# Patient Record
Sex: FEMALE | Race: WHITE
[De-identification: names, ages, dates, MRNs, and addresses within clinical notes are randomized per-mention and may not be internally consistent; named-entity substitution may affect disease eponyms.]

---

## 2017-03-06 NOTE — ER DOCUMENT REPORT
HPI





- HPI


Patient complains to provider of: right eye redness


Onset: Yesterday


Onset/Duration: Gradual


Quality of pain: Burning


Pain Level: 4


Context: 


Patient complains of right eye redness and drainage that started yesterday.  

Patient states she woke up today with some matting to her eye.  Patient denies 

any change in vision.  Patient does state that she wears 2 week disposable 

contact lenses but has not worn them in over a week.  Patient does report some 

light sensitivity.  Patient denies sleeping in her contact lenses.


Associated Symptoms: Other - Right eye redness


Exacerbated by: Denies


Relieved by: Denies


Similar symptoms previously: No


Recently seen / treated by doctor: No





- ROS


ROS below otherwise negative: Yes


Systems Reviewed and Negative: Yes All other systems reviewed and negative





- EENT


EENT: REPORTS: Eye problems





- NEURO


Neurology: DENIES: Headache





- GASTROINTESTINAL


Gastrointestinal: DENIES: Patient vomiting





- REPRODUCTIVE


Reproductive: DENIES: Pregnant:





- DERM


Skin Color: Normal


Skin Problems: None





Past Medical History





- General


Information source: Patient





- Social History


Smoking Status: Never Smoker


Frequency of alcohol use: None


Drug Abuse: None


Occupation: none


Lives with: Family


Family History: Reviewed & Not Pertinent





- Medical History


Medical History: Negative


Pulmonary Medical History: Reports: Hx Asthma


   Denies: Hx Tuberculosis


Surgical Hx: Negative





- Immunizations


Hx Diphtheria, Pertussis, Tetanus Vaccination: No





Vertical Provider Document





- CONSTITUTIONAL


Agree With Documented VS: Yes


Exam Limitations: No Limitations


General Appearance: WD/WN





- HEENT


HEENT: Atraumatic, Normocephalic


Notes: 


right eye injected, no purulent drainage. Pt with 1 mm opacificated area 

concerning for corneal ulcer. No corneal abrasion, dendrite, or foreign body. 

EOMI, PERRL





- NECK


Neck: Normal Inspection





- RESPIRATORY


Respiratory: No Respiratory Distress


O2 Sat by Pulse Oximetry: 97





- MUSCULOSKELETAL/EXTREMETIES


Musculoskeletal/Extremeties: MAEW





- NEURO


Level of Consciousness: Awake, Alert, Appropriate


Motor/Sensory: No Motor Deficit





- DERM


Integumentary: Warm, Dry, No Rash





Course





- Re-evaluation


Re-evalutation: 





03/06/17 14:53


consulted with dr Daily who agrees to see pt in office tomorrow, recommends 

besivance 1 gtt q2 hour while awake and erthromycin ointment at bedtime.





- Vital Signs


Vital signs: 


 











Temp Pulse Resp BP Pulse Ox


 


 97.7 F   73   24 H  153/88 H  97 


 


 03/06/17 12:27  03/06/17 12:27  03/06/17 12:27  03/06/17 12:27  03/06/17 12:27














Discharge





- Discharge


Clinical Impression: 


Cornea ulcer


Qualifiers:


 Laterality: right Qualified Code(s): H16.001 - Unspecified corneal ulcer, 

right eye





Condition: Stable


Disposition: HOME, SELF-CARE


Instructions:  Eyedrop Use ([facility]), Antibiotic Therapy ([facility]), Corneal Ulceration (

[facility])


Additional Instructions: 


Return immediately for any new or worsening symptoms





Followup with your primary care provider, call tomorrow to make a followup 

appointment





Follow up with Dr. Daily tomorrow morning at 8:30, bring your glasses with you 

as well as her current insurance card.





Call your primary care provider today to let them know that you need emergent 

ophthalmology follow-up, and will need a referral to Dr. Daily's office





Do not wear ear contact lenses until cleared to do so by an ophthalmologist.


Prescriptions: 


Erythromycin Base [Erythromycin] 1 applic OP QHS #3.5 oint..gm.


Besifloxacin HCl [Besivance 0.6% Oph Susp 5 ml] 1 drop OP ASDIR PRN #1 bottle


 PRN Reason: 


Forms:  Elevated Blood Pressure


Referrals: 


CUBA DAILY MD [ACTIVE STAFF] - Follow up tomorrow

## 2017-08-23 NOTE — ER DOCUMENT REPORT
ED GI/





- General


Mode of Arrival: Ambulatory


Information source: Patient


TRAVEL OUTSIDE OF THE [place] IN LAST 30 DAYS: No





- HPI


Patient complains to provider of: Dysuria.  No: Vaginal discharge


Associated symptoms: Other - see above





- General


Chief Complaint: Pain With Urination


Stated Complaint: URINARY SYMPTOMS


Time Seen by Provider: 08/23/17 08:31


Notes: 


Patient is a 32 year old female who presents to the ED with complaints of 

dysuria, burning and frequency with urination.  Patient denies any vaginal 

discharge.  Patient states that she has had bladder infections in the past. 

Patients last menstrual period was 08/07/2017.  


 (RODOLFO LOPEZ)





- Related Data


Allergies/Adverse Reactions: 


 





No Known Allergies Allergy (Verified 08/23/17 08:20)


 











Past Medical History





- General


Information source: Patient





- Social History


Smoking Status: Never Smoker


Chew tobacco use (# tins/day): No


Smoking Education Provided: No


Frequency of alcohol use: None


Family History: Reviewed & Not Pertinent


Patient has suicidal ideation: No


Patient has homicidal ideation: No


Pulmonary Medical History: Reports: Hx Asthma


Renal/ Medical History: Denies: Hx Peritoneal Dialysis


Surgical Hx: Negative





- Immunizations


Hx Diphtheria, Pertussis, Tetanus Vaccination: No





Review of Systems





- Review of Systems


Constitutional: No symptoms reported


EENT: No symptoms reported


Cardiovascular: No symptoms reported


Respiratory: No symptoms reported


Gastrointestinal: No symptoms reported


Genitourinary: See HPI, Burning, Dysuria, Frequency


Female Genitourinary: No symptoms reported


Musculoskeletal: No symptoms reported


Skin: No symptoms reported


Hematologic/Lymphatic: No symptoms reported


Neurological/Psychological: No symptoms reported





Physical Exam





- General


General appearance: Appears well, Alert


In distress: None





- HEENT


Head: Normocephalic, Atraumatic


Eyes: Normal


Extraocular movements intact: Yes


Pupils: PERRL





- Respiratory


Respiratory status: No respiratory distress





- Abdominal


Inspection: Obese


Tenderness: Tender - superpubic





- Back


Back: Normal





- Extremities


General upper extremity: Normal inspection, Normal ROM


General lower extremity: Normal inspection, Normal ROM





- Neurological


Neuro grossly intact: Yes





- Psychological


Associated symptoms: Normal affect, Normal mood





- Skin


Skin Temperature: Warm


Skin Moisture: Dry


Skin Color: Normal





- Vital signs


Vitals: 





 











Temp Pulse Resp BP Pulse Ox


 


 98.2 F   86   16   154/104 H  96 


 


 08/23/17 08:23  08/23/17 08:23  08/23/17 08:23  08/23/17 08:23  08/23/17 08:23














- Vital Signs


Vital signs: 





 











Temp Pulse Resp BP Pulse Ox


 


 98.2 F   86   16   154/104 H  96 


 


 08/23/17 08:23  08/23/17 08:23  08/23/17 08:23  08/23/17 08:23  08/23/17 08:23














- Laboratory


Laboratory results interpreted by me: 





 











  08/23/17





  09:27


 


Urine Protein  >=500 H


 


Urine Blood  MODERATE H


 


Ur Leukocyte Esterase  LARGE H














Discharge





- Discharge


Clinical Impression: 


 Hemorrhagic cystitis





Condition: Stable


Disposition: HOME, SELF-CARE


Additional Instructions: 


Urinary Tract Infection





     Your evaluation indicates that you have a urinary tract infection. This is 

due to germs growing in the bladder.  This is a common problem.


     This infection usually responds quickly to antibiotics.  Your antibiotic 

should be taken exactly as prescribed.  Drink plenty of fluids -- three to four 

quarts a day.


     Occasionally, a bladder anesthetic will be prescribed to help stop the 

feeling of urgency until the antibiotic has a chance to clear the infection.  

This may cause your urine to be dark orange.


     Certain urine infections require a culture.  If the doctor obtained a 

culture, the results will be back in two days.  You should call to see if a 

change in treatment is needed.


     A repeat urinalysis after you finish treatment is often recommended.  The 

physician will let you know if further testing is required.


     Call the doctor if you develop fever, chills, flank pain, inability to 

urinate, or blood in the urine.








TAKE THE MEDICATIONS AS PRESCRIBED.


DRINK PLENTY OF FLUIDS.


FOLLOW UP WITH A LOCAL MEDICAL DOCTOR IF NOT IMPROVING.





RETURN TO THE EMERGENCY ROOM IF ANY NEW OR WORSENING SYMPTOMS.








Prescriptions: 


Cephalexin Monohydrate [Keflex 500 mg Capsule] 500 mg PO QID #20 capsule


Phenazopyridine HCl [Pyridium 200 mg Tablet] 200 mg PO TID #10 tablet


Referrals: 


MEGHA CORRALES MD [Primary Care Provider] - Follow up as needed


Scribe Attestation: 





08/23/17 10:07


I personally performed the services described in the documentation, reviewed 

and edited the documentation which was dictated to the scribe in my presence, 

and it accurately records my words and actions. (NEENA ROMERO)





Scribe Documentation





- Scribe


Written by Kit:: kit Morrison, 08/23/2017, 0843


acting as scribe for :: Love

## 2017-12-17 NOTE — ER DOCUMENT REPORT
ED General





- General


Chief Complaint: Arm Pain


Stated Complaint: ELBOW/JAW PAIN, HAND SWELLING


Time Seen by Provider: 12/17/17 11:41


TRAVEL OUTSIDE OF THE [place] IN LAST 30 DAYS: No





- HPI


Patient complains to provider of: Right elbow pain left hand pain left jaw pain


Notes: 





Patient coming in for the above-stated symptoms ongoing for the past month.  

Patient denies any trauma.  Patient denies any past medical history denies 

taking medication patient she has been taking Tylenol for her pain control 

patient states the reason for coming in today because she wanted an opinion 

about what could possibly be going on.  Denies any fevers chills nausea vomiting





- Related Data


Allergies/Adverse Reactions: 


 





No Known Allergies Allergy (Verified 12/17/17 10:58)


 











Past Medical History





- Social History


Smoking Status: Never Smoker


Frequency of alcohol use: None


Drug Abuse: None


Family History: Reviewed & Not Pertinent


Patient has suicidal ideation: No


Patient has homicidal ideation: No


Pulmonary Medical History: Reports: Hx Asthma


Renal/ Medical History: Denies: Hx Peritoneal Dialysis





- Immunizations


Hx Diphtheria, Pertussis, Tetanus Vaccination: No





Review of Systems





- Review of Systems


Constitutional: No symptoms reported


EENT: No symptoms reported


Cardiovascular: No symptoms reported


Respiratory: No symptoms reported


Gastrointestinal: No symptoms reported


Genitourinary: No symptoms reported


Female Genitourinary: No symptoms reported


Musculoskeletal: Other - Right elbow pain left hand pain left jaw pain


Skin: No symptoms reported


Hematologic/Lymphatic: No symptoms reported


Neurological/Psychological: No symptoms reported





Physical Exam





- Vital signs


Vitals: 





 











Temp Pulse Resp BP Pulse Ox


 


 99.1 F   73   16   146/86 H  98 


 


 12/17/17 11:10  12/17/17 11:10  12/17/17 11:10  12/17/17 11:10  12/17/17 11:10











Interpretation: Normal





- General


General appearance: Appears well, Alert





- HEENT


Head: Normocephalic, Atraumatic


Eyes: Normal


Conjunctiva: Normal


Cornea: Normal


Extraocular movements intact: Yes


Eyelashes: Normal


Pupils: PERRL


Ears: Normal


External canal: Normal


Tympanic membrane: Normal


Sinus: Normal


Nasal: Normal


Mouth/Lips: Normal


Mucous membranes: Normal


Pharynx: Normal


Neck: Normal





- Respiratory


Respiratory status: No respiratory distress


Chest status: Nontender


Breath sounds: Normal


Chest palpation: Normal





- Cardiovascular


Rhythm: Regular


Heart sounds: Normal auscultation


Murmur: No





- Abdominal


Inspection: Normal


Distension: No distension


Bowel sounds: Normal


Tenderness: Nontender


Organomegaly: No organomegaly





- Back


Back: Normal, Nontender





- Extremities


General upper extremity: Nontender, Normal color, Normal ROM, Normal 

temperature.  No: Normal inspection - Small swelling of the left MCP joint of 

the index finger no decreased range of motion no redness no signs of infected 

etiology.


General lower extremity: Normal inspection, Nontender, Normal color, Normal ROM

, Normal temperature, Normal weight bearing.  No: Eric's sign





- Neurological


Neuro grossly intact: Yes


Cognition: Normal


Orientation: AAOx4


[place] Coma Scale Eye Opening: Spontaneous


Andrei Coma Scale Verbal: Oriented


Andrei Coma Scale Motor: Obeys Commands


Andrei Coma Scale Total: 15


Speech: Normal


Motor strength normal: LUE, RUE, LLE, RLE


Sensory: Normal





- Psychological


Associated symptoms: Normal affect, Normal mood





- Skin


Skin Temperature: Warm


Skin Moisture: Dry


Skin Color: Normal





Course





- Re-evaluation


Re-evalutation: 





12/17/17 15:43


Patient coming in for evaluation of right elbow pain left hand pain left jaw 

pain.  Examination shows some slight swelling of the joints no swelling of the 

jaw patient has full range of motion of her jaw.  No signs of dental infection 

or signs of ear infection.  Unclear etiology jaw pain possibility of underlying 

connective tissue issue or rheumatoid arthritis for the elbow and for the hand.

  Explained to patient she will need follow-up with a family care physician for 

further etiologies





- Vital Signs


Vital signs: 





 











Temp Pulse Resp BP Pulse Ox


 


 99.1 F   73   16   146/86 H  98 


 


 12/17/17 11:10  12/17/17 11:10  12/17/17 11:10  12/17/17 11:10  12/17/17 11:10














Discharge





- Discharge


Clinical Impression: 


 joint pain and swelling





Disposition: HOME, SELF-CARE


Instructions:  Arthralgia ([facility])


Additional Instructions: 


Examination today is consistent with more likely underlying soft tissue disease 

like rheumatoid arthritis.  Specific testing will need to be performed by 

outpatient provider.  Your examination that arrives reveals no critical 

etiology.  We will start you on steroids and anti-inflammatory medication you 

can continue to take you continue Tylenol for pain control.  I will give her 

information to our social workers to assist you in follow-up.


Prescriptions: 


Naproxen [Naprosyn 250 mg Tablet] 250 mg PO DAILY PRN #30 tablet


 PRN Reason: 


Prednisone [Deltasone] 60 mg PO DAILY #24 tablet


Forms:  Return to Work


Referrals: 


[facility] [Provider Group] - Follow up as needed

## 2018-01-09 NOTE — ER DOCUMENT REPORT
HPI





- HPI


Patient complains to provider of: Right elbow pain and left second MCP joint 

pain


Onset: Other - Early January


Onset/Duration: Gradual


Quality of pain: Throbbing


Pain Level: 3


Context: 





32-year-old morbidly obese female is complaining of reoccurrence of her right 

elbow pain causing her to not be able to extend fully and also pain in her 

second left MCP joint.  She was seen in the emergency department in December 

and told that she might have arthritis and needed to follow-up.  She is a 

patient of [facility] and is waiting for them to call her 

back for referral.  She is here today because it resolved after her December 

treatment with anti-inflammatories and prednisone and is now reoccurred.  No 

history of gout.  No fever.


Associated Symptoms: None


Exacerbated by: Denies


Relieved by: Denies


Similar symptoms previously: Yes


Recently seen / treated by doctor: No





- ROS


ROS below otherwise negative: Yes


Systems Reviewed and Negative: Yes All other systems reviewed and negative





- REPRODUCTIVE


Reproductive: DENIES: Pregnant:





- MUSCULOSKELETAL


Musculoskeletal: REPORTS: Extremity pain





Past Medical History





- General


Information source: Patient





- Social History


Smoking Status: Never Smoker


Chew tobacco use (# tins/day): No


Frequency of alcohol use: None


Drug Abuse: None


Lives with: Family


Family History: Reviewed & Not Pertinent


Patient has suicidal ideation: No


Patient has homicidal ideation: No


Pulmonary Medical History: Reports: Hx Asthma


Renal/ Medical History: Denies: Hx Peritoneal Dialysis


Surgical Hx: Negative





- Immunizations


Hx Diphtheria, Pertussis, Tetanus Vaccination: No





Vertical Provider Document





- CONSTITUTIONAL


Agree With Documented VS: Yes


Exam Limitations: No Limitations





- INFECTION CONTROL


TRAVEL OUTSIDE OF THE [place] IN LAST 30 DAYS: No





- HEENT


HEENT: Atraumatic, Normocephalic





- NECK


Neck: Supple


Notes: 





tender top of left shoulder, above the mid clavicle





- RESPIRATORY


Respiratory: Breath Sounds Normal, No Respiratory Distress


O2 Sat by Pulse Oximetry: 100





- CARDIOVASCULAR


Cardiovascular: Regular Rate, Regular Rhythm





- MUSCULOSKELETAL/EXTREMETIES


Musculoskeletal/Extremeties: Tender.  negative: FROM


Notes: 





warm and tender over the lef 2nd MCP join, unable to extend right elbow fully, N

/V intact, 2+ radial pulse. Pt denies injury





- NEURO


Level of Consciousness: Awake, Alert, Appropriate


Motor/Sensory: No Motor Deficit, No Sensory Deficit





- DERM


Integumentary: Warm, Dry





Course





- Re-evaluation


Re-evalutation: 





01/09/18 16:15


X-ray shows effusion in the right elbow, the sed rate and CRP are elevated, the 

rheumatoid factor is negative, the MACARIO is pending.  CBC is normal.  Copies of 

lab work and x-ray are given to the patient.  She understands she will need to 

follow-up with her rheumatologist.  Naprosyn given for pain and inflammation.


01/09/18 16:15








- Vital Signs


Vital signs: 


 











Temp Pulse Resp BP Pulse Ox


 


 97.9 F   81   20   143/90 H  100 


 


 01/09/18 12:56  01/09/18 12:56  01/09/18 12:56  01/09/18 12:56  01/09/18 12:56














- Laboratory


Result Diagrams: 


 01/09/18 14:20








Procedures





- Immobilization


  ** Right Arm


Time completed: 16:25


Pre-Proc Neuro Vasc Exam: Normal


Immobilizer type: Sling


Performed by: PCT


Post-Proc Neuro Vasc Exam: Normal


Alignment checked and good: Yes





Discharge





- Discharge


Clinical Impression: 


 Polyarticular joint pain inflammation





Condition: Good


Disposition: HOME, SELF-CARE


Instructions:  Temporary Sling ([facility]), Arthritis ([facility]), Anti-Inflammatory 

Medication ([facility])


Additional Instructions: 


see rheumatologist


anti inflammatory naproxen twice a day with food


see [facility] for your referral


to er any concerns


Prescriptions: 


Naproxen Sodium 550 mg PO BID #30 tablet


Referrals: 


MEGHA CORRALES MD [Primary Care Provider] - Follow up as needed


АЛЕКСАНДР JURADO MD [ACTIVE STAFF] - Follow up tomorrow

## 2018-01-09 NOTE — RADIOLOGY REPORT (SQ)
EXAM DESCRIPTION:  ELBOW RIGHT OVER 2 VIEWS



COMPLETED DATE/TIME:  1/9/2018 2:52 pm



REASON FOR STUDY:  pain with extension right elbow



COMPARISON:  None.



NUMBER OF VIEWS:  Four views.



TECHNIQUE:  AP, lateral, and both oblique radiographic images acquired of the right elbow.



LIMITATIONS:  None.



FINDINGS:  MINERALIZATION: Normal.

BONES: No acute fracture or dislocation.  No worrisome bone lesions.

JOINT: Small elbow joint effusion.

SOFT TISSUES: No soft tissue swelling.  No foreign body.

OTHER: No other significant finding.



IMPRESSION:  Small elbow joint effusion, best shown on lateral film.

No acute fracture or malalignment



TECHNICAL DOCUMENTATION:  JOB ID:  [id number]

 2011 [employer]- All Rights Reserved

## 2018-02-26 NOTE — RADIOLOGY REPORT (SQ)
EXAM DESCRIPTION:  VENOUS UNILATERAL UPPER



COMPLETED DATE/TIME:  2/26/2018 2:13 pm



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



COMPARISON:  None.



TECHNIQUE:  Dynamic and static gray scale and color images acquired of the right arm venous system. S
elected spectral images acquired with additional compression and augmentation maneuvers. The contrala
teral subclavian vein and internal jugular vein were also imaged. Images stored on PACS.



LIMITATIONS:  None.



FINDINGS:  INTERNAL JUGULAR VEIN: Normal phasicity, compression, augmentation. No visualized echogeni
c material on gray scale. No defects on color images. Comparison opposite side normal.

SUBCLAVIAN VEIN: Normal compression, augmentation. No visualized echogenic material on gray scale. No
 defects on color images.

AXILLARY VEIN: Normal compression, augmentation. No visualized echogenic material on gray scale. No d
efects on color images.

BRACHIAL VEIN: Normal compression, augmentation. No visualized echogenic material on gray scale. No d
efects on color images.

BASILIC VEIN: Normal compression, augmentation. No visualized echogenic material on gray scale. No de
fects on color images.

CEPHALIC VEIN: Normal compression, augmentation. No visualized echogenic material on gray scale. No d
efects on color images.

OTHER: No other significant finding.

CONTRALATERAL SUBCLAVIAN VEIN AND INTERNAL JUGULAR VEIN:

Normal phasicity, compression and augmentation. No visualized echogenic material on gray scale. No de
fects on color images.



IMPRESSION:  NO EVIDENCE DVT OR SVT IN THE RIGHT ARM.



TECHNICAL DOCUMENTATION:  JOB ID:  [id number]

 2011 [employer]- All Rights Reserved



Reading location - IP/workstation name: [email or web address]

## 2018-08-04 NOTE — RADIOLOGY REPORT (SQ)
EXAM DESCRIPTION:  U/S OB TRANSVAGINAL W/O DOP



COMPLETED DATE/TIME:  8/4/2018 3:23 pm



REASON FOR STUDY:  + preg andbleeding



COMPARISON:  None.



TECHNIQUE:  Transvaginal static and realtime grayscale images acquired of the pelvis. Additional andi
cted spectral and color Doppler images recorded. All images stored on PACs.

CLINICAL DATES:  7 weeks 1 day.



LIMITATIONS:  None.



FINDINGS:  UTERUS: The uterus measures 10.9 x 5.0 x 4.5 cm.

CERVICAL LENGTH: 3.2.  Small amount of fluid endocervical canal there Closed.

RIGHT ADNEXA: Right ovary nonvisualized.

LEFT ADNEXA: Left ovary nonvisualized.

FREE FLUID: None seen.



IMPRESSION:  No IUP visualized.  Clinical follow-up with serial beta HCG  and ultrasound could be rep
eated and later interval if indicated.

Trimester of pregnancy:  First - 0 to 13 weeks.



TECHNICAL DOCUMENTATION:  JOB ID:  [id number]

[id number]

 2011 [employer]- All Rights Reserved                            rev-5/18



Reading location - IP/workstation name: [email or web address]

## 2018-08-04 NOTE — ER DOCUMENT REPORT
ED General





- General


Chief Complaint: Vag Bleeding, +preg <12wks


Stated Complaint: VAGINAL BLEEDING


Time Seen by Provider: 18 14:38


TRAVEL OUTSIDE OF THE [place] IN LAST 30 DAYS: No





- HPI


Notes: 





Patient is a 33-year-old female , + urine preg test 3 days ago (approx 4 

wks), with a history of rheumatoid arthritis who presents to the ED complaining 

of vaginal bleeding that started a couple hours ago.  Patient states that she 

found out she was pregnant 3 days ago.  Patient states that she started having 

some cramping and bleeding today.  She is still eating and drinking without 

difficulties.  She is urinating normally and having normal bowel movements.  

Denies any drug allergies.  Denies any smoking, IV drug use, or alcohol intake.

  She has not had any previous miscarriages or abortions otherwise.  She has 

not had any other vaginal odor or discharge.  Denies any headache, fever, URI, 

sore throat, chest pain, palpitations, syncope, cough, shortness of breath, 

wheeze, dyspnea, nausea/vomiting/diarrhea, urinary retention, dysuria, hematuria

, back pain, loss of control of bowel or bladder, numbness/tingling, muscle 

paralysis/weakness, or rash.





- Related Data


Allergies/Adverse Reactions: 


 





No Known Allergies Allergy (Verified 18 14:27)


 











Past Medical History





- General


Last Menstrual Period: 6/15/2018





- Social History


Smoking Status: Never Smoker


Frequency of alcohol use: None


Drug Abuse: None


Family History: Reviewed & Not Pertinent


Patient has suicidal ideation: No


Patient has homicidal ideation: No


Pulmonary Medical History: Reports: Hx Asthma


Renal/ Medical History: Denies: Hx Peritoneal Dialysis





- Immunizations


Hx Diphtheria, Pertussis, Tetanus Vaccination: No





Review of Systems





- Review of Systems


-: Yes All other systems reviewed and negative





Physical Exam





- Vital signs


Vitals: 


 











Temp Pulse Resp BP Pulse Ox


 


 100.0 F   111 H  22 H  144/92 H  96 


 


 18 14:31  18 14:31  18 14:31  18 14:31  18 14:31














- Notes


Notes: 





PHYSICAL EXAMINATION:





Vitals:  HR 90 during exam.





GENERAL: Well-appearing, well-nourished and in no acute distress.  Morbid 

obesity.





LUNGS: Breath sounds clear to auscultation bilaterally and equal.  No wheezes 

rales or rhonchi.





HEART: Regular rate and rhythm without murmurs, rubs, gallops.





ABDOMEN: Soft, nontender, nondistended abdomen.  No guarding, no rebound.  

Normal bowel sounds present.  No CVA tenderness bilaterally.





: deferred





Musculoskeletal: FROM to passive/active. Strength 5+/5. 





Extremities:  No cyanosis, clubbing, or edema b/l.  Peripheral pulses 2+.  

Capillary refill less than 3 seconds.





NEUROLOGICAL: Normal speech, normal gait.  





PSYCH: crying (emotional due to bleeding), normal affect.





SKIN: Warm, Dry, normal turgor, no rashes or lesions noted.





Course





- Re-evaluation


Re-evalutation: 





18 15:33


Patient is an afebrile, well-hydrated, 33-year-old female who presents to the 

ED with vaginal bleeding and negative pregnancy test.  Vitals are acceptable 

without any significant tachycardia, tachypnea, or hypoxia.  PE is otherwise 

unremarkable.  CBC, CMP, lipase unremarkable for acute pathology.  HCG 

negative.  TVUS unremarkable for acute pathology.  Pt's abd is soft and non-

tender.  Patient is nontoxic-appearing is tolerating p.o. without any 

difficulties.  No other labs or imaging warranted at this time based on H&P.  

Low suspicion/risk for acute appendicitis, bowel obstruction, acute 

cholecystitis, acute cholangitis, perforated diverticulitis, incarcerated hernia

, pancreatitis, perforated ulcer, peritonitis, sepsis, pelvic inflammatory 

disease, ectopic pregnancy, tubo-ovarian abscess, ovarian torsion, or other 

systemic emergent condition at this time.  Patient is aware that her condition 

can change from initial presentation and she needs to monitor symptoms closely 

and seek medical attention if any acute changes.   Conservative measures 

otherwise for symptoms.  Recheck with your PCM/OBGYN in 3-5 days.  Return to 

the ED with any worsening/concerning symptoms otherwise as reviewed in 

discharge.  Patient is in agreement.








- Vital Signs


Vital signs: 


 











Temp Pulse Resp BP Pulse Ox


 


 100.0 F   111 H  22 H  144/92 H  96 


 


 18 14:31  18 14:31  18 14:31  18 14:31  18 14:31














- Laboratory


Result Diagrams: 


 18 14:57





 18 14:57


Laboratory results interpreted by me: 


 











  18





  14:57 14:57


 


WBC  10.9 H 


 


RDW  18.2 H 


 


Seg Neutrophils %  85.3 H 


 


Lymphocytes %  10.8 L 


 


Monocytes %  2.9 L 


 


Absolute Neutrophils  9.3 H 


 


Glucose   114 H


 


AST   13 L














Discharge





- Discharge


Clinical Impression: 


 Vaginal bleeding





Condition: Stable


Disposition: HOME, SELF-CARE


Additional Instructions: 


Maintain fluid intake


Proper hygenic technique


Keep the skin clean


Tylenol as needed


Return immediately if symptoms worsen


F/u with your PCM/OBGYN in 3-5 days for a recheck


Return to the ED with any development of HA/fever, trouble with vision, eye 

redness, worsening pain, urethral discharge, urinary retention, blood in the 

urine, flank pain, abdominal pain, n/v, Chest Pain, shortness of breath, joint 

pains, trouble breathing, or any other worsening/concerning symptoms as needed 

otherwise.


Forms:  Elevated Blood Pressure


Referrals: 


[facility] [Provider Group] - Follow up in 3-5 days


NORA CARDENAS PA-C [Primary Care Provider] - Follow up in 3-5 days

## 2018-08-04 NOTE — ER DOCUMENT REPORT
ED Medical Screen (RME)





- General


Chief Complaint: Vag Bleeding, +preg <12wks


Stated Complaint: VAGINAL BLEEDING


Time Seen by Provider: 08/04/18 14:38


Notes: 





33-year-old female, g4 para 3 individual diagnosed with pregnancy this week by 

her primary care doctor.  HCG was positive in the office.  Blood work was 

ordered but she does not know the results.  Began having spotting and bleeding 

and cramping this morning.  Did not pass out.  No shortness of breath.  No 

other major symptoms at this time.  Patient is tearful and is afraid of a 

miscarriage.





I have greeted and performed a rapid initial assessment of this patient.  A 

comprehensive ED assessment and evaluation of the patient, analysis of test 

results and completion of the medical decision making process will be conducted 

by additional ED providers.


TRAVEL OUTSIDE OF THE [place] IN LAST 30 DAYS: No





- Related Data


Allergies/Adverse Reactions: 


 





No Known Allergies Allergy (Verified 08/04/18 14:27)


 











Past Medical History


Pulmonary Medical History: Reports: Hx Asthma


Renal/ Medical History: Denies: Hx Peritoneal Dialysis





- Immunizations


Hx Diphtheria, Pertussis, Tetanus Vaccination: No





Physical Exam





- Vital signs


Vitals: 


 











Temp Pulse Resp BP Pulse Ox


 


 100.0 F   111 H  22 H  144/92 H  96 


 


 08/04/18 14:31  08/04/18 14:31  08/04/18 14:31  08/04/18 14:31  08/04/18 14:31














Course





- Vital Signs


Vital signs: 


 











Temp Pulse Resp BP Pulse Ox


 


 100.0 F   111 H  22 H  144/92 H  96 


 


 08/04/18 14:31  08/04/18 14:31  08/04/18 14:31  08/04/18 14:31  08/04/18 14:31














Doctor's Discharge





- Discharge


Referrals: 


NORA CARDENAS PA-C [Primary Care Provider] - Follow up as needed

## 2019-04-04 ENCOUNTER — HOSPITAL ENCOUNTER (OUTPATIENT)
Dept: HOSPITAL 62 - OROUT | Age: 34
Discharge: HOME | End: 2019-04-04
Attending: DENTIST
Payer: MEDICAID

## 2019-04-04 VITALS — DIASTOLIC BLOOD PRESSURE: 85 MMHG | SYSTOLIC BLOOD PRESSURE: 133 MMHG

## 2019-04-04 DIAGNOSIS — Z79.899: ICD-10-CM

## 2019-04-04 DIAGNOSIS — M06.9: ICD-10-CM

## 2019-04-04 DIAGNOSIS — E66.01: ICD-10-CM

## 2019-04-04 DIAGNOSIS — K02.63: Primary | ICD-10-CM

## 2019-04-04 PROCEDURE — 41899 UNLISTED PX DENTALVLR STRUX: CPT

## 2019-04-04 PROCEDURE — 81025 URINE PREGNANCY TEST: CPT

## 2019-04-04 NOTE — OPERATIVE REPORT
Operative Report


DATE OF SURGERY: 04/04/19


PREOPERATIVE DIAGNOSIS: Malpositioned and carious wisdom teeth numbers 1 and 16


POSTOPERATIVE DIAGNOSIS: Same


OPERATION: Surgical removal of teeth numbers 1 and 16


SURGEON: JAVIER PIERRE


ANESTHESIA: GA


TISSUE REMOVED OR ALTERED: Teeth which were discarded


COMPLICATIONS: 





None


ESTIMATED BLOOD LOSS: Minimal


INTRAOPERATIVE FINDINGS: Malpositioned and carious wisdom teeth numbers 1 and 16


PROCEDURE: 





The patient was brought into operating room #4 and placed on the operating room 

table in supine position. General anesthesia was induced via a peripheral IV and

continued utilizing endotracheal intubation. The patient was then prepped and 

draped in the usual fashion for an intraoral procedure. A total of 2 carpules of

2% Lidocaine with 1:100K Epi were delivered to the planned surgical sites via 

infiltration. The oral cavity and oropharynx were suctioned and a moistened 

oropharyngeal throat pack was placed. Full thickness mucoperisteal flaps were 

elevated. These were via envelope incisions. Ostectomy was completed as needed. 

No sinus exposure noted. All sites debrided and irrigated. The oral cavity was 

suctioned and found to be free of debris. The throat pack was removed. The 

oropharynx was suctioned. Gauze packs were placed bilaterally to aid in 

continued hemastasis. The patient was awakened from anesthesia, extubated in the

operating room and taken to recovery room and spontaneous breathing fashion.

## 2020-01-10 ENCOUNTER — HOSPITAL ENCOUNTER (EMERGENCY)
Dept: HOSPITAL 62 - ER | Age: 35
Discharge: HOME | End: 2020-01-10
Payer: MEDICAID

## 2020-01-10 VITALS — DIASTOLIC BLOOD PRESSURE: 50 MMHG | SYSTOLIC BLOOD PRESSURE: 139 MMHG

## 2020-01-10 DIAGNOSIS — J06.9: Primary | ICD-10-CM

## 2020-01-10 DIAGNOSIS — R50.9: ICD-10-CM

## 2020-01-10 DIAGNOSIS — H92.09: ICD-10-CM

## 2020-01-10 DIAGNOSIS — J35.1: ICD-10-CM

## 2020-01-10 DIAGNOSIS — J02.9: ICD-10-CM

## 2020-01-10 DIAGNOSIS — R09.81: ICD-10-CM

## 2020-01-10 DIAGNOSIS — R05: ICD-10-CM

## 2020-01-10 PROCEDURE — 96372 THER/PROPH/DIAG INJ SC/IM: CPT

## 2020-01-10 PROCEDURE — 99283 EMERGENCY DEPT VISIT LOW MDM: CPT

## 2020-01-10 PROCEDURE — 87077 CULTURE AEROBIC IDENTIFY: CPT

## 2020-01-10 PROCEDURE — 87880 STREP A ASSAY W/OPTIC: CPT

## 2020-01-10 PROCEDURE — 87070 CULTURE OTHR SPECIMN AEROBIC: CPT

## 2020-01-10 NOTE — ER DOCUMENT REPORT
HPI





- HPI


Time Seen by Provider: 01/10/20 18:46


Pain Level: 2


Notes: 





Patient is a 34-year-old female who presents to the ED complaining of nasal 

congestion/discharge, dry nonproductive cough, fever, body ache, s/t 1-2 days. 

Patient states that she is still eating and drinking without difficulties, but 

does have a decreased p.o. intake.  She is still urinating normally having 

normal bowel movements.  Patient has been using some over-the-counter meds for 

symptoms.  She denies any significant past medical history including 

cardiopulmonary history and immunocompromised conditions.  Denies any current 

headache, neck pain, chest pain, palpitations, syncope, shortness of breath, 

wheeze, dyspnea, abdominal pain, nausea/vomiting/diarrhea, urinary retention, 

dysuria, hematuria, or rash.





- ROS


Systems Reviewed and Negative: Yes All other systems reviewed and negative





- EENT


EENT: REPORTS: Sore Throat, Ear Pain.  DENIES: Eye problems





- RESPIRATORY


Respiratory: REPORTS: Coughing





- REPRODUCTIVE


Reproductive: DENIES: Pregnant:





Past Medical History





- Social History


Smoking Status: Never Smoker


Family History: Reviewed & Not Pertinent


Patient has suicidal ideation: No


Patient has homicidal ideation: No





- Past Medical History


Cardiac Medical History: 


   Denies: Hx Coronary Artery Disease, Hx Heart Attack, Hx Hypertension


Pulmonary Medical History: 


   Denies: Hx Asthma, Hx Bronchitis, Hx COPD, Hx Pneumonia


Neurological Medical History: Denies: Hx Cerebrovascular Accident, Hx Seizures


Renal/ Medical History: Denies: Hx Peritoneal Dialysis


Musculoskeletal Medical History: Reports Hx Arthritis





- Immunizations


Hx Diphtheria, Pertussis, Tetanus Vaccination: Yes





Vertical Provider Document





- CONSTITUTIONAL


Agree With Documented VS: Yes


Notes: 





PHYSICAL EXAMINATION:





GENERAL: Well-appearing, well-nourished and in no acute distress.  A&Ox4.  

Answers questions appropriately.  Moves comfortably w/o notable distress





HEAD: Atraumatic, normocephalic.





EYES: Pupils equal round and reactive to light, extraocular movements intact, 

sclera anicteric, conjunctiva are normal.





ENT:  Nares patent and with clear discharge.  oropharynx mild erythema without 

exudates.  3+ tonsilar hypertrophy with mild erythema no exudate.  No palatine 

shift.  Uvula midline.  No tongue protrusion.  No drooling, hoarseness, or 

airway compromise.  Moist mucous membranes.  No sinus tenderness.





NECK: Normal range of motion, supple without lymphadenopathy.  No 

rigidity/meningismus.





LUNGS: Breath sounds clear to auscultation bilaterally and equal.  No wheezes 

rales or rhonchi.  No retractions





HEART: Regular rate and rhythm without murmurs, rubs, gallops.





ABDOMEN: Soft, nontender, nondistended abdomen.  No guarding, no rebound.  

Normal bowel sounds present.  No CVA tenderness bilaterally.  





NEUROLOGICAL: Normal speech, normal gait.  





PSYCH: Normal mood, normal affect.





SKIN: Warm, Dry, normal turgor, no rashes or lesions noted.





- INFECTION CONTROL


TRAVEL OUTSIDE OF THE [place] IN LAST 30 DAYS: No





Course





- Re-evaluation


Re-evalutation: 





01/10/20 19:54


Patient is an afebrile, well-hydrated, 34-year-old female who presents to the ED

with acute URI, suspect viral/influenza.  Vitals are acceptable.  PE is 

otherwise unremarkable.  Rapid strep negative with culture pending.  No further 

labs or imaging warranted at this time based on H&P.  Pt does have a h/o asthma.

 Patient's lungs are clear to auscultation bilaterally without tachycardia, 

hypoxia, or tachypnea.  Patient is tolerating p.o. without any difficulties.  

Thoroughly reviewed the risks, benefits, potential side effects, estimated cost 

without insurance with patient.  After thorough review, patient requested 

Tamiflu at this time.  Low suspicion for any meningitis, sepsis, 

peritonsillar/pharyngeal abscess, respiratory compromise, severe dehydration, or

other emergent systemic condition at this time.  Patient is aware this condition

can change from initial presentation and she needs to monitor symptoms closely. 

Conservative measures otherwise for symptoms.  Recheck with your PCM in 3-5 

days.  Return to the ED with any worsening/concerning symptoms otherwise as 

reviewed in discharge.  Patient is in agreement.





- Vital Signs


Vital signs: 


                                        











Temp Pulse Resp BP Pulse Ox


 


 97.3 F   106 H  18   154/99 H  100 


 


 01/10/20 18:39  01/10/20 18:39  01/10/20 18:39  01/10/20 18:39  01/10/20 18:39














Discharge





- Discharge


Clinical Impression: 


 Acute URI





Condition: Stable


Disposition: HOME, SELF-CARE


Instructions:  Upper Respiratory Illness ([facility])


Additional Instructions: 


Maintain adequate fluid intake


tylenol/ibuprofen as needed alternating every 3 hours for fever/body ache


over the counter cold medication as needed for symptoms


Humidified air may help


Wash your hands regularly


Wear a mask when coughing


F/u:  with your PCM in 3-5 days for a recheck





Return to the ED with any fever, altered mental status/behavior, chest pain, 

palpitations, syncope, headache, neck pain/stiffness, shortness of breath, chest

pains, wheezing, drooling, trouble swallowing/breathing, abdominal pain, n/v/d, 

rash, or worsening/concerning symptoms otherwise.


Prescriptions: 


Oseltamivir Phosphate [Tamiflu 75 mg Capsule] 75 mg PO BID #10 capsule


Forms:  Elevated Blood Pressure


Referrals: 


NORA CARDENAS PA-C [Primary Care Provider] - Follow up as needed

## 2020-07-14 ENCOUNTER — HOSPITAL ENCOUNTER (EMERGENCY)
Dept: HOSPITAL 62 - ER | Age: 35
Discharge: HOME | End: 2020-07-14
Payer: MEDICAID

## 2020-07-14 VITALS — SYSTOLIC BLOOD PRESSURE: 113 MMHG | DIASTOLIC BLOOD PRESSURE: 64 MMHG

## 2020-07-14 DIAGNOSIS — M06.9: Primary | ICD-10-CM

## 2020-07-14 DIAGNOSIS — Z79.899: ICD-10-CM

## 2020-07-14 DIAGNOSIS — M25.552: ICD-10-CM

## 2020-07-14 PROCEDURE — 72110 X-RAY EXAM L-2 SPINE 4/>VWS: CPT

## 2020-07-14 PROCEDURE — 99283 EMERGENCY DEPT VISIT LOW MDM: CPT

## 2020-07-14 PROCEDURE — 81025 URINE PREGNANCY TEST: CPT

## 2020-07-14 NOTE — RADIOLOGY REPORT (SQ)
EXAM DESCRIPTION:  L SPINE WHOLE



IMAGES COMPLETED DATE/TIME:  7/14/2020 2:58 pm



REASON FOR STUDY:  left hip pain, lbp pain



COMPARISON:  None.



NUMBER OF VIEWS:  Five views including obliques.



TECHNIQUE:  AP, lateral, oblique, and sacral radiographic images acquired of the lumbar spine.



LIMITATIONS:  None.



FINDINGS:  MINERALIZATION: Normal.

SEGMENTATION: Normal.  No transitional anatomy.

ALIGNMENT: Normal.

VERTEBRAE: Maintained height.  No fracture or worrisome bone lesion.

DISCS: Preserved height.  No significant osteophytes or end plate irregularity.

POSTERIOR ELEMENTS: Pedicles and facets are intact.  No pars defect or posterior arch defects.

HARDWARE: None in the spine.

PARASPINAL SOFT TISSUES: Normal.

PELVIS: Intact as visualized. No fractures or worrisome bone lesions. SI joints intact.

OTHER: No other significant finding.



IMPRESSION:  1. NORMAL 5 VIEW LUMBAR SPINE.



TECHNICAL DOCUMENTATION:  JOB ID:  [id number]

 2011 [employer]- All Rights Reserved



Reading location - IP/workstation name: [email or web address]

## 2020-07-14 NOTE — RADIOLOGY REPORT (SQ)
EXAM DESCRIPTION:  HIP LEFT AP/LATERAL



IMAGES COMPLETED DATE/TIME:  7/14/2020 2:58 pm



REASON FOR STUDY:  left hip pain x 1d, no trauma



COMPARISON:  None.



NUMBER OF VIEWS:  Two views.



TECHNIQUE:  AP pelvis and additional frog-leg view of the left hip.



LIMITATIONS:  None.



FINDINGS:  MINERALIZATION: Normal.

LEFT HIP: No fracture or dislocation.  No worrisome bone lesions.

RIGHT HIP: No fracture or dislocation.  No worrisome bone lesions.

PUBIS AND ISCHIUM: No fracture.

PELVIS: No fracture.

SACRUM: No fracture or dislocation. No worrisome bone lesions.

LOWER LUMBAR SPINE: No fracture or dislocation. No worrisome bone lesions.  No significant disc disea
se.

SOFT TISSUES: No findings.

OTHER: No other significant finding.



IMPRESSION:  1. NEGATIVE STUDY OF THE LEFT HIP AND PELVIS.



TECHNICAL DOCUMENTATION:  JOB ID:  [id number]

 2011 [employer]- All Rights Reserved



Reading location - IP/workstation name: [email or web address]

## 2020-07-14 NOTE — ER DOCUMENT REPORT
HPI





- HPI


Time Seen by Provider: 07/14/20 14:07


Pain Level: 5


Notes: 





35-year-old female with a history of RA presents emergency room for complaints 

of left hip pain that started last night, and states this morning she started 

with some numbness and tingling going down her legs.  Denies any trauma.  Has 

not tried any thing over-the-counter for the pain.  Worse with walking, better 

at rest.  Has not tried any heat or icing.








- REPRODUCTIVE


Reproductive: DENIES: Pregnant:





Past Medical History





- General


Information source: Patient





- Social History


Smoking Status: Never Smoker


Frequency of alcohol use: None


Drug Abuse: None


Family History: Reviewed & Not Pertinent





- Past Medical History


Cardiac Medical History: 


   Denies: Hx Coronary Artery Disease, Hx Heart Attack, Hx Hypertension


Pulmonary Medical History: 


   Denies: Hx Asthma, Hx Bronchitis, Hx COPD, Hx Pneumonia


Neurological Medical History: Denies: Hx Cerebrovascular Accident, Hx Seizures


Renal/ Medical History: Denies: Hx Peritoneal Dialysis


Musculoskeletal Medical History: Reports Hx Arthritis - rheumatoid





- Immunizations


Hx Diphtheria, Pertussis, Tetanus Vaccination: Yes





Vertical Provider Document





- CONSTITUTIONAL


Agree With Documented VS: Yes


Exam Limitations: No Limitations


General Appearance: WD/WN


Notes: 








MEDICATIONS: I agree with the patient medications as charted by the RN.





ALLERGIES: I agree with the allergies as charted by the RN.





PAST MEDICAL HISTORY/PAST SURGICAL HISTORY: Reviewed and agree as charted by RN.





SOCIAL HISTORY: Reviewed and agree as charted by RN.





FAMILY HISTORY: No significant familial comorbid conditions directly related to 

patient complaint














PHYSICAL EXAMINATION:





GENERAL: Well-appearing, morbidly obese and in no acute distress.





HEAD: Atraumatic, normocephalic.





EYES: Pupils equal round and reactive to light, extraocular movements intact, 

conjunctiva are normal.





ENT: Nares patent, oropharynx clear without exudates.  Moist mucous membranes.





NECK: Normal range of motion, supple without lymphadenopathy





LUNGS: Breath sounds clear to auscultation bilaterally and equal.  No wheezes 

rales or rhonchi.





HEART: Regular rate and rhythm without murmurs





ABDOMEN: Soft, nontender, nondistended abdomen.  No guarding, no rebound.  No 

masses appreciated.





Female : deferred





Musculoskeletal: Normal range of motion, no pitting or edema.  No cyanosis. left

hip noted pain with abduction and extension of lumbar back at 10 degrees.  dtr +

2 bilaterally and equally in BLE. full motor and sensory function. normal gait. 

cap refill < 3 seconds. distal pulses + 2 in BLE. lower back examination wnl. No

erythema, warmth  to touch, deformity, crepitus or obvious asymmetry of the 

affected leg compared to other of hips equally. 








NEUROLOGICAL: Cranial nerves grossly intact.  Normal speech, normal gait.  

Normal sensory, motor exams





PSYCH: Normal mood, normal affect.





SKIN: Warm, Dry, normal turgor, no rashes or lesions noted.

















Dictation was performed using Dragon voice recognition software





- INFECTION CONTROL


TRAVEL OUTSIDE OF THE [place] IN LAST 30 DAYS: No





Course





- Re-evaluation


Re-evalutation: 





07/14/20 16:16


Afebrile vital stable no distress.  Nurses notes reviewed.  X-ray of lumbar 

spine and hip x-ray were negative for acute fracture dislocation or foreign 

body.  Discussed with patient that she needs to take anti-inflammatory and 

Flexeril as needed as is likely a strain.  Advised apply heat 20 minutes on 20 

minutes off several times a day.  Follow-up with orthopedic specialist and 

primary care provider within 24 to 48 hours for reevaluation.  Advised return to

the emergency room for any red flag symptoms such as bowel or bladder 

incontinence, saddle anesthesia.  After performing a Medical Screening 

Examination, I estimate there is LOW risk for EXPANDING OR RUPTURED ABDOMINAL 

AORTIC ANEURYSM, CAUDA EQUINA SYNDROME, EPIDURAL MASS ABSCESS OR LESION(S), 

OSTEOMYELITIS,PERSONAL HISTORY OF CANCER, IMMUNOSUPPERSSSION, HISTORY OF IV DRUG

USE, FRACTURE, CORD COMPERSSION, CANCER, RETROPERITONEAL BLEED, SPINAL EPIDURAL 

HEMATOMA, or HERNIATED DISK CAUSING SEVERE SPINAL STENOSIS, thus I consider the 

discharge disposition reasonable.  I have reevaluated this patient multiple 

times and no significant life threatening changes are noted. The patient  and I 

have discussed the diagnosis and risks, and we agree with discharging home and 

close follow-up. We also discussed returning to the Emergency Department 

immediately if new or worsening symptoms occur with the understanding that 

symptoms and presentations can change. We have discussed the symptoms which are 

most concerning (e.g., saddle anesthesia, urinary or bowel incontinence or 

retention, changing or worsening pain) that necessitate immediate return.











- Vital Signs


Vital signs: 


                                        











Temp Pulse Resp BP Pulse Ox


 


 98.6 F   77   16   132/80 H  100 


 


 07/14/20 13:50  07/14/20 13:50  07/14/20 13:50  07/14/20 13:50  07/14/20 13:50














Discharge





- Discharge


Clinical Impression: 


 Left hip pain





Condition: Stable


Disposition: HOME, SELF-CARE


Instructions:  Sprain ([facility])


Additional Instructions: 


Your x-rays were normal today.  Please apply heat 20 minutes on 20 minutes off 

several times a day.  Please use muscle relaxer as directed, do not drive a car,

operate heavy machinery or drink alcohol while taking this medication because 

sedation or impairment of cognitive function.  Take naproxen as needed for pain.

 Follow-up with orthopedic specialist and primary care provider in the next 24 

to 48 hours.





Return immediately for any new or worsening symptoms.





Follow up with primary care provider, call tomorrow to make followup 

appointment.


Prescriptions: 


Cyclobenzaprine HCl [Flexeril 10 mg Tablet] 10 mg PO TIDP PRN #9 tab


 PRN Reason: 


Naproxen 500 mg PO BID #10 tablet


Referrals: 


SUSAN DE LA FUENTE MD [ACTIVE STAFF] - Follow up as needed


BALBINA MO DO [NO LOCAL MD] - Follow up as needed

## 2021-01-05 ENCOUNTER — HOSPITAL ENCOUNTER (OUTPATIENT)
Dept: HOSPITAL 62 - OD | Age: 36
End: 2021-01-05
Payer: MEDICAID

## 2021-01-05 DIAGNOSIS — Z79.899: ICD-10-CM

## 2021-01-05 DIAGNOSIS — M06.00: Primary | ICD-10-CM

## 2021-01-05 LAB
ADD MANUAL DIFF: NO
AST SERPL-CCNC: 35 U/L (ref 14–36)
BASOPHILS # BLD AUTO: 0 10^3/UL (ref 0–0.2)
BASOPHILS NFR BLD AUTO: 0.2 % (ref 0–2)
EOSINOPHIL # BLD AUTO: 0.3 10^3/UL (ref 0–0.6)
EOSINOPHIL NFR BLD AUTO: 4.6 % (ref 0–6)
ERYTHROCYTE [DISTWIDTH] IN BLOOD BY AUTOMATED COUNT: 18.1 % (ref 11.5–14)
HCT VFR BLD CALC: 33.3 % (ref 36–47)
HGB BLD-MCNC: 11.4 G/DL (ref 12–15.5)
LYMPHOCYTES # BLD AUTO: 1.7 10^3/UL (ref 0.5–4.7)
LYMPHOCYTES NFR BLD AUTO: 25.8 % (ref 13–45)
MCH RBC QN AUTO: 27.9 PG (ref 27–33.4)
MCHC RBC AUTO-ENTMCNC: 34.2 G/DL (ref 32–36)
MCV RBC AUTO: 82 FL (ref 80–97)
MONOCYTES # BLD AUTO: 0.2 10^3/UL (ref 0.1–1.4)
MONOCYTES NFR BLD AUTO: 3.3 % (ref 3–13)
NEUTROPHILS # BLD AUTO: 4.3 10^3/UL (ref 1.7–8.2)
NEUTS SEG NFR BLD AUTO: 66.1 % (ref 42–78)
PLATELET # BLD: 285 10^3/UL (ref 150–450)
RBC # BLD AUTO: 4.07 10^6/UL (ref 3.72–5.28)
TOTAL CELLS COUNTED % (AUTO): 100 %
WBC # BLD AUTO: 6.5 10^3/UL (ref 4–10.5)

## 2021-01-05 PROCEDURE — 82565 ASSAY OF CREATININE: CPT

## 2021-01-05 PROCEDURE — 85025 COMPLETE CBC W/AUTO DIFF WBC: CPT

## 2021-01-05 PROCEDURE — 84450 TRANSFERASE (AST) (SGOT): CPT

## 2021-01-05 PROCEDURE — 36415 COLL VENOUS BLD VENIPUNCTURE: CPT

## 2021-01-05 PROCEDURE — 86480 TB TEST CELL IMMUN MEASURE: CPT

## 2021-01-05 PROCEDURE — 84460 ALANINE AMINO (ALT) (SGPT): CPT
